# Patient Record
(demographics unavailable — no encounter records)

---

## 2025-04-26 NOTE — PHYSICAL EXAM
[Right] : right hip [] : no pain with flexion and external rotation [FreeTextEntry8] : no IT band tenderness

## 2025-04-26 NOTE — ASSESSMENT
[FreeTextEntry1] : Recommend repeating MDP as this helped in the past. Advised no NSAIDs.  Heat, rest. Activity modification. Start PT for hip and back. Follow up in 1 week with pain mgt specialist.

## 2025-04-26 NOTE — HISTORY OF PRESENT ILLNESS
[Buttock] : buttock [Right Leg] : right leg [7] : 7 [1] : 2 [Nothing helps with pain getting better] : Nothing helps with pain getting better [de-identified] : 4/26/25: 54y female present with right leg pain since March 2025. Denies specific injury but states pain started after going for a walk. There is pain from her buttock to her anterior thigh. She reports pain occasionally radiates to her knee and into calf. There is a tightness and aching pain. Denies numbness/tingling. She tried advil with temporary relief. H/o lumbar injury treated in Aug with PT. [] : no [FreeTextEntry5] : RT LEG PAIN/BUTTOCK PAIN [de-identified] : sleeping on the hip

## 2025-05-12 NOTE — HISTORY OF PRESENT ILLNESS
[FreeTextEntry1] : pt is present states she is having pain in the right leg it goes down to the foot  she states she has been doing Pt and it has been helping  [Right Leg] : right leg [6] : 6 [0] : 0 [Radiating] : radiating [Intermittent] : intermittent [Sleep] : sleep [Lying in bed] : lying in bed [] : Patient is currently injured and not playing sports: no [FreeTextEntry6] : cramping  [FreeTextEntry7] : right foot  [FreeTextEntry9] : steroid pack

## 2025-06-03 NOTE — HISTORY OF PRESENT ILLNESS
[Right Leg] : right leg [6] : 6 [0] : 0 [Radiating] : radiating [Intermittent] : intermittent [Sleep] : sleep [Lying in bed] : lying in bed [FreeTextEntry1] : pt is following up for mri results, MD will review images  [] : Patient is currently injured and not playing sports: no [FreeTextEntry6] : cramping  [FreeTextEntry7] : right foot  [FreeTextEntry9] : steroid pack